# Patient Record
Sex: MALE | Race: WHITE | NOT HISPANIC OR LATINO
[De-identification: names, ages, dates, MRNs, and addresses within clinical notes are randomized per-mention and may not be internally consistent; named-entity substitution may affect disease eponyms.]

---

## 2021-01-22 ENCOUNTER — APPOINTMENT (OUTPATIENT)
Dept: CARDIOLOGY | Facility: CLINIC | Age: 86
End: 2021-01-22
Payer: MEDICARE

## 2021-01-22 ENCOUNTER — NON-APPOINTMENT (OUTPATIENT)
Age: 86
End: 2021-01-22

## 2021-01-22 VITALS
HEART RATE: 74 BPM | SYSTOLIC BLOOD PRESSURE: 140 MMHG | OXYGEN SATURATION: 100 % | BODY MASS INDEX: 26.46 KG/M2 | WEIGHT: 174 LBS | TEMPERATURE: 97.7 F | DIASTOLIC BLOOD PRESSURE: 60 MMHG

## 2021-01-22 PROCEDURE — 99214 OFFICE O/P EST MOD 30 MIN: CPT

## 2021-01-22 PROCEDURE — 93000 ELECTROCARDIOGRAM COMPLETE: CPT

## 2021-01-22 PROCEDURE — 99204 OFFICE O/P NEW MOD 45 MIN: CPT

## 2021-01-22 RX ORDER — OMEPRAZOLE 40 MG/1
40 CAPSULE, DELAYED RELEASE ORAL DAILY
Refills: 0 | Status: ACTIVE | COMMUNITY

## 2021-01-22 RX ORDER — FUROSEMIDE 40 MG/1
40 TABLET ORAL DAILY
Refills: 0 | Status: ACTIVE | COMMUNITY

## 2021-01-22 RX ORDER — POTASSIUM CHLORIDE 1500 MG/1
20 TABLET, EXTENDED RELEASE ORAL DAILY
Refills: 0 | Status: ACTIVE | COMMUNITY

## 2021-01-22 NOTE — ASSESSMENT
[FreeTextEntry1] : 1.  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  EKG and physical exam unremarkable\par \par 2.  Exertional shortness of breath usually with bending with normal EKG.  Doubt CHF.  Probably related to weight gain recent hormonal therapy for prostate carcinoma\par \par 3.  Prostate carcinoma on hormonal therapy at the present time\par \par 4.  Hypertension controlled\par \par 5 hyperlipidemia on statin\par \par 6.  Nasal polyps\par \par Presently his cardiac status is stable but he is complaining of some increase in shortness of breath.  The shortness of breath certainly could be related to the nasal polyps as well as some recent weight gain and the hormonal therapy

## 2021-01-22 NOTE — REASON FOR VISIT
[FreeTextEntry1] : Fred [place] 85 years old well known to me history of chronic ischemic heart disease status post PTCI status post bypass surgery, hypertension, hyperlipidemia nasal polyps.

## 2021-01-22 NOTE — PHYSICAL EXAM
[Normal Appearance] : normal appearance [General Appearance - Well Nourished] : well nourished [Normal Conjunctiva] : the conjunctiva exhibited no abnormalities [No Jugular Venous Garcia A Waves] : no jugular venous garcia A waves [Heart Sounds] : normal S1 and S2 [Murmurs] : no murmurs present [Respiration, Rhythm And Depth] : normal respiratory rhythm and effort [Auscultation Breath Sounds / Voice Sounds] : lungs were clear to auscultation bilaterally [Abdomen Soft] : soft [Abdomen Tenderness] : non-tender [Cyanosis, Localized] : no localized cyanosis [FreeTextEntry1] : No edema pulses 2+ dorsalis pedis

## 2021-01-22 NOTE — DISCUSSION/SUMMARY
[FreeTextEntry1] : 1.  2D echo to reevaluate LV and RV function pulmonary hypertension etc.\par \par 2.  Continue present regimen of medications\par \par 3.  Obtain his most recent bloods from Dr. Trevino

## 2021-01-22 NOTE — HISTORY OF PRESENT ILLNESS
[FreeTextEntry1] : Fred is 85 years old with a long history of chronic ischemic heart disease.  He presented many years ago with recurrent angina and underwent several stents to multiple vessels and finally had recurrence of symptoms and restenosis and underwent coronary bypass surgery many years ago by Dr. Carlos Greenfield\par \par Since bypass surgery he has had no recurrence of angina\par \par He has a history of nasal polyps which need to be removed, recent history of prostate carcinoma on hormonal therapy.  Since being on hormonal therapy he has had some increase in shortness of breath but no angina.  He has shortness of breath when he bends down or exerts himself significantly.  He has responded to the addition of Lasix.  Again he denies any chest pain chest pressure dizziness palpitations or syncope\par \par Presently he is feeling well with shortness of breath when he bends down or exerts himself significantly without associated chest pain.\par \par He is completing his hormonal therapy for prostate carcinoma\par \par He has gained weight during COVID-19 as well as from the hormonal therapy that he is receiving

## 2021-01-28 ENCOUNTER — APPOINTMENT (OUTPATIENT)
Dept: CARDIOLOGY | Facility: CLINIC | Age: 86
End: 2021-01-28

## 2021-03-04 ENCOUNTER — APPOINTMENT (OUTPATIENT)
Dept: CARDIOLOGY | Facility: CLINIC | Age: 86
End: 2021-03-04
Payer: MEDICARE

## 2021-03-04 VITALS
WEIGHT: 174 LBS | TEMPERATURE: 97.9 F | OXYGEN SATURATION: 95 % | BODY MASS INDEX: 26.37 KG/M2 | SYSTOLIC BLOOD PRESSURE: 174 MMHG | RESPIRATION RATE: 17 BRPM | HEART RATE: 71 BPM | DIASTOLIC BLOOD PRESSURE: 66 MMHG | HEIGHT: 68 IN

## 2021-03-04 PROCEDURE — 99213 OFFICE O/P EST LOW 20 MIN: CPT

## 2021-03-04 PROCEDURE — 93306 TTE W/DOPPLER COMPLETE: CPT

## 2021-03-04 NOTE — DISCUSSION/SUMMARY
[FreeTextEntry1] : 1.  I reassured him that his cardiac status was stable and there was unlikely that there was a cardiac cause of his shortness of breath\par \par 2.  Blood pressure needs better control I suggested low-salt diet exercise and continued weight reduction\par \par 3.  Continue present regimen of medications\par \par 4.  Routine follow with me in 4 months unless is a change in his status

## 2021-03-04 NOTE — ASSESSMENT
[FreeTextEntry1] : 1.  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  EKG and physical exam unremarkable and continue to be unremarkable\par \par 2.  Exertional shortness of breath usually with bending with normal EKG.  Doubt CHF.  Probably related to weight gain recent hormonal therapy for prostate carcinoma, nasal congestion and wearing a mask\par \par 3.  Prostate carcinoma on hormonal therapy at the present time\par \par 4.  Hypertension the patient anxious and systolic pressure is somewhat elevated diastolic pressure normal\par \par 5 hyperlipidemia on statin\par \par 6.  Nasal polyps\par \par Her cardiogram performed today preliminarily reveals overall preserved LV function and no significant valvular disease

## 2021-03-04 NOTE — PHYSICAL EXAM
[Normal Appearance] : normal appearance [General Appearance - Well Nourished] : well nourished [Normal Conjunctiva] : the conjunctiva exhibited no abnormalities [No Jugular Venous Garcia A Waves] : no jugular venous garcia A waves [Respiration, Rhythm And Depth] : normal respiratory rhythm and effort [Auscultation Breath Sounds / Voice Sounds] : lungs were clear to auscultation bilaterally [Heart Sounds] : normal S1 and S2 [Murmurs] : no murmurs present [Abdomen Soft] : soft [Abdomen Tenderness] : non-tender [Cyanosis, Localized] : no localized cyanosis [FreeTextEntry1] : No edema pulses 2+ dorsalis pedis

## 2021-03-04 NOTE — HISTORY OF PRESENT ILLNESS
[FreeTextEntry1] : Fred is 85 years old with a long history of chronic ischemic heart disease.  He presented many years ago with recurrent angina and underwent several stents to multiple vessels and finally had recurrence of symptoms and restenosis and underwent coronary bypass surgery many years ago by Dr. Carlos Greenfield\par \par Since bypass surgery he has had no recurrence of angina\par \par He has a history of nasal polyps which need to be removed, recent history of prostate carcinoma on hormonal therapy.  Since being on hormonal therapy he has had some increase in shortness of breath but no angina.  He has shortness of breath when he bends down or exerts himself significantly.  He has responded to the addition of Lasix.  Again he denies any chest pain chest pressure dizziness palpitations or syncope\par \par Last visit he was not feeling well with shortness of breath when he bends down or exerts himself significantly without associated chest pain.  There was no chest pain\par \par He is completing his hormonal therapy for prostate carcinoma\par \par He has gained weight during COVID-19 as well as from the hormonal therapy that he is receiving\par \par The symptoms of shortness of breath with bending down have continued but he does have nasal congestion and is wearing a mask which of the times when the shortness of breath is wors\par \par There is no associated chest pain.  He does feel occasionally that his chest wall is uncomfortable but this is unrelated to exertion\par \par echo cardiogram performed today preliminarily reveals overall preserved LV function with perhaps some septal hypokinesia with paradoxical septal motion post surgery.

## 2021-03-04 NOTE — REASON FOR VISIT
[FreeTextEntry1] : Fred [place] 85 years old here for follow-up of his cardiac status with recent symptoms of shortness of breath most likely related to his nasal congestion and the wearing of mask rather than a cardiac etiology\par Echocardiogram was performed today

## 2021-07-06 ENCOUNTER — APPOINTMENT (OUTPATIENT)
Dept: CARDIOLOGY | Facility: CLINIC | Age: 86
End: 2021-07-06
Payer: MEDICARE

## 2021-07-06 ENCOUNTER — NON-APPOINTMENT (OUTPATIENT)
Age: 86
End: 2021-07-06

## 2021-07-06 VITALS
RESPIRATION RATE: 17 BRPM | WEIGHT: 170 LBS | DIASTOLIC BLOOD PRESSURE: 61 MMHG | HEIGHT: 68 IN | HEART RATE: 61 BPM | SYSTOLIC BLOOD PRESSURE: 122 MMHG | OXYGEN SATURATION: 97 % | BODY MASS INDEX: 25.76 KG/M2

## 2021-07-06 PROCEDURE — 99214 OFFICE O/P EST MOD 30 MIN: CPT

## 2021-07-06 PROCEDURE — 93000 ELECTROCARDIOGRAM COMPLETE: CPT

## 2021-07-06 NOTE — HISTORY OF PRESENT ILLNESS
[FreeTextEntry1] : To review Fred is 85 years old with a long history of chronic ischemic heart disease.  He presented many years ago with recurrent angina and underwent several stents to multiple vessels and finally had recurrence of symptoms and restenosis and underwent coronary bypass surgery many years ago by Dr. Carlos Greenfield\par \par Since bypass surgery he has had no recurrence of angina\par \par He has a history of nasal polyps which need to be removed, recent history of prostate carcinoma on hormonal therapy.  Since being on hormonal therapy he has had some increase in shortness of breath but no angina.  He has shortness of breath when he bends down or exerts himself significantly.  He has responded to the addition of Lasix.  Again he denies any chest pain chest pressure dizziness palpitations or syncope\par \par Last visit he was not feeling well with shortness of breath when he bends down or exerts himself significantly without associated chest pain.  There was no chest pain\par \par He is completing his hormonal therapy for prostate carcinoma\par \par He has gained weight during COVID-19 as well as from the hormonal therapy that he is receiving\par \par The symptoms of shortness of breath with bending down have continued but he does have nasal congestion and is wearing a mask which of the times when the shortness of breath is worse\par \par echo cardiogram performed in May 2021 revealed mild paradoxical septal motion secondary to previous cardiac surgery otherwise no valve disease normal LV and RV function and no pulmonary hypertension\par \par Since last visit he has been stable.  He does feel shortness of breath with the mask and also secondary to severe nasal polyps being followed by ear nose and throat.  He has no chest pain palpitations dizziness or syncope\par \par EKG today reveals normal sinus rhythm sinus bradycardia first-degree AV block otherwise within normal limits

## 2021-07-06 NOTE — ASSESSMENT
[FreeTextEntry1] : 1.  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  EKG and physical exam unremarkable and continue to be unremarkable.  Echocardiogram reveals normal LV function no valvular disease\par \par 2.  Exertional shortness of breath usually with bending with normal EKG and normal echo.  The symptoms of shortness of breath are somewhat improved and probably related to his nasal polyps and wearing a mask\par \par 3.  Prostate carcinoma on hormonal therapy at the present time\par \par 4.  Hypertension well-controlled today\par \par 5 hyperlipidemia on statin\par \par 6.  Nasal polyps\par \par

## 2021-07-06 NOTE — PHYSICAL EXAM
[Normal Appearance] : normal appearance [General Appearance - Well Nourished] : well nourished [Normal Conjunctiva] : the conjunctiva exhibited no abnormalities [No Jugular Venous Garcia A Waves] : no jugular venous garcia A waves [Auscultation Breath Sounds / Voice Sounds] : lungs were clear to auscultation bilaterally [Respiration, Rhythm And Depth] : normal respiratory rhythm and effort [Heart Sounds] : normal S1 and S2 [Murmurs] : no murmurs present [Abdomen Soft] : soft [Abdomen Tenderness] : non-tender [Cyanosis, Localized] : no localized cyanosis [FreeTextEntry1] : No edema pulses 2+ dorsalis pedis

## 2021-07-06 NOTE — DISCUSSION/SUMMARY
[FreeTextEntry1] : 1.  I reassured him that his cardiac status was stable and there was unlikely that there was a cardiac cause of his shortness of breath\par \par 2.  Continue present regimen medications.  Continue with low-salt diet and low caloric diet and encourage exercise\par \par Routine follow with me again in 4 months

## 2021-11-02 ENCOUNTER — APPOINTMENT (OUTPATIENT)
Dept: CARDIOLOGY | Facility: CLINIC | Age: 86
End: 2021-11-02
Payer: MEDICARE

## 2021-11-02 ENCOUNTER — NON-APPOINTMENT (OUTPATIENT)
Age: 86
End: 2021-11-02

## 2021-11-02 VITALS
OXYGEN SATURATION: 92 % | HEIGHT: 68 IN | SYSTOLIC BLOOD PRESSURE: 135 MMHG | DIASTOLIC BLOOD PRESSURE: 71 MMHG | HEART RATE: 58 BPM | BODY MASS INDEX: 26.07 KG/M2 | WEIGHT: 172 LBS

## 2021-11-02 PROCEDURE — 99214 OFFICE O/P EST MOD 30 MIN: CPT

## 2021-11-02 PROCEDURE — 93000 ELECTROCARDIOGRAM COMPLETE: CPT

## 2021-11-02 RX ORDER — AMLODIPINE BESYLATE 2.5 MG/1
2.5 TABLET ORAL
Refills: 0 | Status: ACTIVE | COMMUNITY

## 2021-11-02 NOTE — HISTORY OF PRESENT ILLNESS
[FreeTextEntry1] : To review Fred is 85 years old with a long history of chronic ischemic heart disease.  He presented many years ago with recurrent angina and underwent several stents to multiple vessels and finally had recurrence of symptoms and restenosis and underwent coronary bypass surgery many years ago by Dr. Carlos Greenfield\par \par Since bypass surgery he has had no recurrence of angina\par \par He has a history of nasal polyps which need to be removed, recent history of prostate carcinoma on hormonal therapy.  Since being on hormonal therapy he has had some increase in shortness of breath but no angina.  He has shortness of breath when he bends down or exerts himself significantly.  He has responded to the addition of Lasix.  Again he denies any chest pain chest pressure dizziness palpitations or syncope\par \par Last visit he was not feeling well with shortness of breath when he bends down or exerts himself significantly without associated chest pain.  There was no chest pain\par \par He is completing his hormonal therapy for prostate carcinoma\par \par He has gained weight during COVID-19 as well as from the hormonal therapy that he is receiving\par \par The symptoms of shortness of breath with bending down have continued but he does have nasal congestion and is wearing a mask which of the times when the shortness of breath is worse\par \par echo cardiogram performed in May 2021 revealed mild paradoxical septal motion secondary to previous cardiac surgery otherwise no valve disease normal LV and RV function and no pulmonary hypertension\par \par Patient continues to complain of shortness of breath at 1-2 blocks or with bending down.  There is no associated chest pain.  However he does have significant nasal congestion and nasal polyps.  The symptoms have been stable.  On the other hand he does move around quite a bit during the day and walks over 2000 steps.\par \par EKG today is unchanged sinus bradycardia first-degree AV block otherwise within normal limits\par \par

## 2021-11-02 NOTE — DISCUSSION/SUMMARY
[FreeTextEntry1] : Fred should continue on his present regimen of medications.  He presently is not taking amlodipine 2.5.  He remains on Lasix 40 metoprolol 50 twice daily potassium replacement aspirin 81 and simvastatin 20.\par \par Routine follow with me again in 3 months\par I will obtain most recent blood work from Dr. Mendoza

## 2021-11-02 NOTE — ASSESSMENT
[FreeTextEntry1] : 1.  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  EKG and physical exam unremarkable and continue to be unremarkable.  Echocardiogram reveals normal LV function no valvular disease.\par \par 2.  Exertional shortness of breath usually with bending with normal EKG and normal echo.  The symptoms of shortness of breath are somewhat improved but still bothersome to him and probably related to his nasal polyps and wearing a mask\par \par 3.  Prostate carcinoma of hormonal therapy at this point\par \par 4.  Hypertension well-controlled today\par \par 5 hyperlipidemia on statin\par \par 6.  Nasal polyps\par \par His main complaint is the shortness of breath which I do not believe is cardiac related.  He is no active angina.

## 2021-12-30 ENCOUNTER — APPOINTMENT (OUTPATIENT)
Dept: CARDIOLOGY | Facility: CLINIC | Age: 86
End: 2021-12-30
Payer: MEDICARE

## 2021-12-30 ENCOUNTER — NON-APPOINTMENT (OUTPATIENT)
Age: 86
End: 2021-12-30

## 2021-12-30 VITALS
SYSTOLIC BLOOD PRESSURE: 130 MMHG | DIASTOLIC BLOOD PRESSURE: 60 MMHG | RESPIRATION RATE: 17 BRPM | BODY MASS INDEX: 25.76 KG/M2 | OXYGEN SATURATION: 99 % | HEART RATE: 53 BPM | HEIGHT: 68 IN | WEIGHT: 170 LBS

## 2021-12-30 DIAGNOSIS — I20.9 ANGINA PECTORIS, UNSPECIFIED: ICD-10-CM

## 2021-12-30 PROCEDURE — 93000 ELECTROCARDIOGRAM COMPLETE: CPT

## 2021-12-30 PROCEDURE — 99214 OFFICE O/P EST MOD 30 MIN: CPT

## 2021-12-30 NOTE — REASON FOR VISIT
[FreeTextEntry1] : Fred [place] 86-year-old seen urgently for evaluation of chest pain of concern to the patient

## 2021-12-30 NOTE — DISCUSSION/SUMMARY
[FreeTextEntry1] : I reassured him that his cardiac status was stable.  I reassured him that this pain was likely musculoskeletal noncardiac.  His physical exam is unremarkable blood pressure is stable stable sinus bradycardia and EKG without change\par \par He will follow with me again in 3 months.\par No further cardiac work-up is needed presently

## 2021-12-30 NOTE — ASSESSMENT
[FreeTextEntry1] : 1.  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  EKG and physical exam unremarkable and continue to be unremarkable.  Echocardiogram reveals normal LV function no valvular disease.  Recent left-sided chest pain is atypical musculoskeletal in nature noncardiac\par \par 2.  Exertional shortness of breath usually with bending with normal EKG and normal echo.  The symptoms of shortness of breath are somewhat improved but still bothersome to him and probably related to his nasal polyps and wearing a mask\par \par 3.  Prostate carcinoma of hormonal therapy at this point\par \par 4.  Hypertension well-controlled today\par \par 5 hyperlipidemia on statin\par \par 6.  Nasal polyps\par \par 7.  Acute noncardiac chest pain normal EKG normal physical exam pinpoint tenderness in the area\par \par His main complaint is the shortness of breath which I do not believe is cardiac related.  He is no active angina.

## 2021-12-30 NOTE — HISTORY OF PRESENT ILLNESS
[FreeTextEntry1] : To review Fred is 85 years old with a long history of chronic ischemic heart disease.  He presented many years ago with recurrent angina and underwent several stents to multiple vessels and finally had recurrence of symptoms and restenosis and underwent coronary bypass surgery many years ago by Dr. Carlos Greenfield\par \par Since bypass surgery he has had no recurrence of angina\par \par He has a history of nasal polyps which need to be removed, recent history of prostate carcinoma on hormonal therapy.  Since being on hormonal therapy he has had some increase in shortness of breath but no angina.  He has shortness of breath when he bends down or exerts himself significantly.  He has responded to the addition of Lasix.  Again he denies any chest pain chest pressure dizziness palpitations or syncope\par \par Last visit he was not feeling well with shortness of breath when he bends down or exerts himself significantly without associated chest pain.  There was no chest pain\par \par He is completing his hormonal therapy for prostate carcinoma\par \par He has gained weight during COVID-19 as well as from the hormonal therapy that he is receiving\par \par The symptoms of shortness of breath with bending down have continued but he does have nasal congestion and is wearing a mask which of the times when the shortness of breath is worse\par \par echo cardiogram performed in May 2021 revealed mild paradoxical septal motion secondary to previous cardiac surgery otherwise no valve disease normal LV and RV function and no pulmonary hypertension\par \par On last visit, the patient continued  to complain of shortness of breath at 1-2 blocks or with bending down.  There is no associated chest pain.  However he does have significant nasal congestion and nasal polyps.  The symptoms have been stable.  On the other hand he does move around quite a bit during the day and walks over 2000 steps.\par \par He recently did a lot of preparation for [place] and invited his nieces.  Did a lot of sweeping and moving of his chest area, he did a lot of movement around. Over the last 2 to 3 days he developed pinpoint left-sided chest pain sharp in nature worse when he touches the area or moves not worse with breathing. His respiratory status remains stable with similar exertional shortness of breath particularly with bending down.\par \par EKG today reveals sinus bradycardia otherwise within normal limits with a prolonged WA interval it is unchanged\par \par \par \par

## 2022-02-02 ENCOUNTER — APPOINTMENT (OUTPATIENT)
Dept: CARDIOLOGY | Facility: CLINIC | Age: 87
End: 2022-02-02
Payer: MEDICARE

## 2022-02-02 VITALS
HEIGHT: 68 IN | BODY MASS INDEX: 25.76 KG/M2 | OXYGEN SATURATION: 97 % | DIASTOLIC BLOOD PRESSURE: 60 MMHG | HEART RATE: 67 BPM | WEIGHT: 170 LBS | SYSTOLIC BLOOD PRESSURE: 120 MMHG

## 2022-02-02 PROCEDURE — 99214 OFFICE O/P EST MOD 30 MIN: CPT

## 2022-02-02 NOTE — ASSESSMENT
[FreeTextEntry1] : 1.  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  EKG and physical exam unremarkable and continue to be unremarkable.  Echocardiogram reveals normal LV function no valvular disease.  Recent left-sided chest pain is atypical musculoskeletal in nature noncardiac\par \par 2.  Exertional shortness of breath usually with bending with normal EKG and normal echo.  The symptoms of shortness of breath are more likely related to his nasal polyps then to a cardiac issue.  He did exercise for me and his saturation remained at 98%\par \par 3.  Prostate carcinoma of hormonal therapy at this point\par \par 4.  Hypertension well-controlled today\par \par 5 hyperlipidemia on statin\par \par 6.  Nasal polyps\par \par 7.  Hematuria/renal stone scheduled for lithotripsy

## 2022-02-02 NOTE — HISTORY OF PRESENT ILLNESS
[FreeTextEntry1] : To review Fred is 85 years old with a long history of chronic ischemic heart disease.  He presented many years ago with recurrent angina and underwent several stents to multiple vessels and finally had recurrence of symptoms and restenosis and underwent coronary bypass surgery many years ago by Dr. Carlos Greenfield\par \par Since bypass surgery he has had no recurrence of angina\par \par He has a history of nasal polyps which need to be removed, recent history of prostate carcinoma on hormonal therapy.  Since being on hormonal therapy he has had some increase in shortness of breath but no angina.  He has shortness of breath when he bends down or exerts himself significantly.  He has responded to the addition of Lasix.  Again he denies any chest pain chest pressure dizziness palpitations or syncope\par \par Last visit he was not feeling well with shortness of breath when he bends down or exerts himself significantly without associated chest pain.  There was no chest pain\par \par He is completing his hormonal therapy for prostate carcinoma\par \par He has gained weight during COVID-19 as well as from the hormonal therapy that he is receiving\par \par The symptoms of shortness of breath with bending down have continued but he does have nasal congestion and is wearing a mask which of the times when the shortness of breath is worse\par \par echo cardiogram performed in May 2021 revealed mild paradoxical septal motion secondary to previous cardiac surgery otherwise no valve disease normal LV and RV function and no pulmonary hypertension\par \par On last visit, the patient continued  to complain of shortness of breath at 1-2 blocks or with bending down.  There is no associated chest pain.  However he does have significant nasal congestion and nasal polyps.  The symptoms have been stable.  On the other hand he does move around quite a bit during the day and walks over 2000 steps.\par \par He recently did a lot of preparation for [place] and invited his nieces.  Did a lot of sweeping and moving of his chest area, he did a lot of movement around. Over the last 2 to 3 days he developed pinpoint left-sided chest pain sharp in nature worse when he touches the area or moves not worse with breathing. His respiratory status remains stable with similar exertional shortness of breath particularly with bending down.\par \par EKG December 30 revealed sinus bradycardia otherwise within normal limits with a prolonged RI interval it is unchanged\par \par The patient continues to have shortness of breath without chest pain.  He does have severe nasal polyps which I believe is the main cause of his shortness of breath.  He has no chest pain\par \par He apparently recently developed hematuria had a cystoscopy and the diagnosis of a renal stone was made and he is scheduled for lithotripsy which will require anesthesia\par \par His main concern is that on the Lasix he is passing a large amount of urine (Lasix 40 mg) I would like to cut it back.  He has no significant edema of his lower extremities presently\par \par \par \par \par \par \par \par

## 2022-02-02 NOTE — DISCUSSION/SUMMARY
[FreeTextEntry1] : 1.  I reassured him that his cardiac status was stable and the shortness of breath was probably not related to any cardiac issue.\par \par 2.  At this point, I see no cardiac contraindication to the planned lithotripsy.  He can stop his aspirin 7 days before.  No further cardiac work-up is needed.  He did have a recent echo that revealed normal left ventricular function and he has no active angina\par \par 3.  The patient can decrease his Lasix from 40 mg to 20\par Routine follow with me again in 3 months

## 2022-02-16 ENCOUNTER — APPOINTMENT (OUTPATIENT)
Dept: CARDIOLOGY | Facility: CLINIC | Age: 87
End: 2022-02-16
Payer: MEDICARE

## 2022-02-16 VITALS
SYSTOLIC BLOOD PRESSURE: 146 MMHG | HEART RATE: 54 BPM | RESPIRATION RATE: 17 BRPM | WEIGHT: 175 LBS | DIASTOLIC BLOOD PRESSURE: 70 MMHG | HEIGHT: 68 IN | OXYGEN SATURATION: 100 % | BODY MASS INDEX: 26.52 KG/M2

## 2022-02-16 PROCEDURE — 99214 OFFICE O/P EST MOD 30 MIN: CPT

## 2022-02-16 NOTE — DISCUSSION/SUMMARY
[FreeTextEntry1] : 1.  No further cardiac work-up is needed\par \par 2.  The patient will make an urgent visit  for reevaluation of his carotids and a carotid Doppler\par \par 3.  Pending this evaluation he can proceed with lithotripsy.  Certainly from a cardiac point of view he is stable

## 2022-02-16 NOTE — HISTORY OF PRESENT ILLNESS
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  Clinically he has been doing quite well without chest pain no shortness of breath.  On occasion he has some atypical chest pain.\par \par Recent echo revealed preserved LV function and EKG normal sinus rhythm otherwise unremarkable.  There is no evidence of an arrhythmia\par \par He developed hematuria and the diagnosis of a renal stone and he was scheduled for lithotripsy\par \par However he related to his internist that he an episode of decreased vision in the left eye approximately 4 months ago.  He has had no significant recurrence of that but occasionally has some numbness in his right arm\par \par He does have a history of known carotid disease and is followed by Dr. Jones and was told in the past he may have about a 50 to 70% stenosis in his carotid.  He has not had a repeat neurologic evaluation with carotid Doppler for some time\par \par Again from a cardiac point of view he is stable\par

## 2022-02-16 NOTE — REASON FOR VISIT
[FreeTextEntry1] : Patricia evans 86 years old was to undergo lithotripsy for hematuria and presumed renal stone.  However he related to his internist Dr. Mendoza that he had an episode of loss of vision in the left eye.  Actually this occurred over 4 months ago.

## 2022-02-16 NOTE — ASSESSMENT
[FreeTextEntry1] : Fred SALDANA'Brien 86 years old had an episode of decreased vision in the left eye approximately 4 months ago.  He has not had any recurrence but has known carotid artery disease followed by neurology but has not had an evaluation for some time\par \par From a cardiac point of view he is stable hemodynamically without angina or CHF recent echo with preserved LV function.\par \par He is scheduled for lithotripsy which is on hold until neurologic evaluation is completed

## 2022-02-16 NOTE — PHYSICAL EXAM
[Well Developed] : well developed [Well Nourished] : well nourished [No Acute Distress] : no acute distress [Normal Conjunctiva] : normal conjunctiva [No Carotid Bruit] : no carotid bruit [Normal S1, S2] : normal S1, S2 [No Murmur] : no murmur [No Edema] : no edema

## 2022-04-05 ENCOUNTER — APPOINTMENT (OUTPATIENT)
Dept: CARDIOLOGY | Facility: CLINIC | Age: 87
End: 2022-04-05
Payer: MEDICARE

## 2022-04-05 VITALS
OXYGEN SATURATION: 100 % | BODY MASS INDEX: 26.61 KG/M2 | HEART RATE: 52 BPM | DIASTOLIC BLOOD PRESSURE: 56 MMHG | SYSTOLIC BLOOD PRESSURE: 128 MMHG | WEIGHT: 175 LBS

## 2022-04-05 DIAGNOSIS — C61 MALIGNANT NEOPLASM OF PROSTATE: ICD-10-CM

## 2022-04-05 PROCEDURE — 99214 OFFICE O/P EST MOD 30 MIN: CPT

## 2022-04-06 RX ORDER — TAMSULOSIN HYDROCHLORIDE 0.4 MG/1
0.4 CAPSULE ORAL
Refills: 0 | Status: ACTIVE | COMMUNITY

## 2022-04-06 RX ORDER — CIPROFLOXACIN HYDROCHLORIDE 500 MG/1
500 TABLET, FILM COATED ORAL
Qty: 8 | Refills: 0 | Status: ACTIVE | COMMUNITY
Start: 2022-03-20

## 2022-04-06 RX ORDER — PHENAZOPYRIDINE HYDROCHLORIDE 100 MG/1
100 TABLET ORAL
Refills: 0 | Status: ACTIVE | COMMUNITY

## 2022-04-08 NOTE — REASON FOR VISIT
[FreeTextEntry1] : Patricia evans 86 years old was to undergo lithotripsy for hematuria and presumed renal stone.  However he related to his internist Dr. Mendoza that he had an episode of loss of vision in the left eye subsequently evaluated by neurology and neurosurgery with a total occlusion being treated medically.  He now comes for follow-up with symptoms of shortness of breath

## 2022-04-08 NOTE — DISCUSSION/SUMMARY
[FreeTextEntry1] : 1.  Presently his cardiac status appears to be stable and I do not feel there is evidence of CHF or angina\par \par 2.  Pulmonary consultation concerning his shortness of breath and ENT follow-up concerning his nasal polyps\par \par 3.  Follow-up with neurology and neurosurgery concerning the carotid disease\par \par 4.  I see no cardiac contraindication to another urologic procedure\par \par Patient can stop aspirin for 5 days prior to the urologic procedure but should restart immediately thereafter.  There is no contraindication to the planned urologic procedure

## 2022-04-08 NOTE — ASSESSMENT
[FreeTextEntry1] : Fred SALDANA'Brien 86 years old had an episode of decreased vision in the left eye approximately 4 months ago.  He has not had any recurrence but has known carotid artery disease followed by neurology but has not had an evaluation for some time\par \par From a cardiac point of view he is stable hemodynamically without angina or CHF last echo 2021 with preserved LV function.\par \par He does have significant carotid disease on the left side with a total occlusion but presently neurologically is asymptomatic.\par \par He is complaining of some increased shortness of breath with bending down and with certain activities but no chest pain.  He does have significant nasal polyps.  His cardiac status appears to be stable and he does not have any active angina\par \par 1.  Chronic ischemic heart disease status post remote PTCI status post coronary bypass surgery no active angina\par \par 2.  Chronic shortness of breath etiology unclear perhaps some diastolic dysfunction but LV function in the past has been normal and there is no angina.  He does have nasal polyps which interferes with his breathing particularly with changing in position\par \par 3.  Nasal polyps\par  4.  Carotid artery disease\par \par 5.  Renal stone status post renal stent will need a repeat procedure\par \par \par \par

## 2022-04-08 NOTE — HISTORY OF PRESENT ILLNESS
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  Clinically he has been doing quite well without chest pain no shortness of breath.  On occasion he has some atypical chest pain.\par \par 2D echo March 2021 revealed preserved LV function and EKG normal sinus rhythm otherwise unremarkable.  There is no evidence of an arrhythmia\par \par He developed hematuria and the diagnosis of a renal stone and he was scheduled for lithotripsy\par \par However he related to his internist that he an episode of decreased vision in the left eye approximately 4 months ago.  He has had no significant recurrence of that but occasionally has some numbness in his right arm\par \par He does have a history of known carotid disease and is followed by Dr. Jones and was told in the past he may have about a 50 to 70% stenosis in his carotid.  He has not had a repeat neurologic evaluation with carotid Doppler for some time\par \par Subsequent evaluation apparently revealed a total occlusion of the carotid and medical therapy was recommended.  I do not have any hard copy of the results of that testing.  Apparently he is scheduled for other testing by the neurosurgeon.  He has no neurologic symptoms\par \par He recently was admitted with severe back pain from a renal stone and required cystoscopy and deployment of a stent.  He still has some back pain but it is improved\par \par He does relate that he has been somewhat more short of breath though this is a chronic issue.  He does have significant nasal polyps.  He was not a smoker.  He has been scheduled to see pulmonology.  He will require another procedure for the stent removal of the stent etc.  He denies chest pain or chest pressure or palpitations\par \par \par

## 2022-04-26 ENCOUNTER — APPOINTMENT (OUTPATIENT)
Dept: CARDIOLOGY | Facility: CLINIC | Age: 87
End: 2022-04-26

## 2022-06-07 ENCOUNTER — APPOINTMENT (OUTPATIENT)
Dept: CARDIOLOGY | Facility: CLINIC | Age: 87
End: 2022-06-07
Payer: MEDICARE

## 2022-06-07 ENCOUNTER — NON-APPOINTMENT (OUTPATIENT)
Age: 87
End: 2022-06-07

## 2022-06-07 VITALS
WEIGHT: 173 LBS | HEIGHT: 68 IN | OXYGEN SATURATION: 100 % | DIASTOLIC BLOOD PRESSURE: 60 MMHG | HEART RATE: 86 BPM | BODY MASS INDEX: 26.22 KG/M2 | SYSTOLIC BLOOD PRESSURE: 128 MMHG

## 2022-06-07 DIAGNOSIS — Z01.810 ENCOUNTER FOR PREPROCEDURAL CARDIOVASCULAR EXAMINATION: ICD-10-CM

## 2022-06-07 DIAGNOSIS — R07.89 OTHER CHEST PAIN: ICD-10-CM

## 2022-06-07 DIAGNOSIS — N20.0 CALCULUS OF KIDNEY: ICD-10-CM

## 2022-06-07 PROCEDURE — 99214 OFFICE O/P EST MOD 30 MIN: CPT

## 2022-06-07 PROCEDURE — 93000 ELECTROCARDIOGRAM COMPLETE: CPT

## 2022-06-07 NOTE — REASON FOR VISIT
[FreeTextEntry1] : Fred Solomon 6 years old here for preop cardiovascular evaluation prior to undergoing stone removal via cystoscopy

## 2022-06-07 NOTE — HISTORY OF PRESENT ILLNESS
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  Clinically he has been doing quite well without chest pain no shortness of breath.  On occasion he has some atypical chest pain.\par \par 2D echo March 2021 revealed preserved LV function and EKG normal sinus rhythm otherwise unremarkable.  There is no evidence of an arrhythmia\par \par He developed hematuria and the diagnosis of a renal stone and he was scheduled for lithotripsy\par \par However he related to his internist that he an episode of decreased vision in the left eye approximately 4 months ago.  He has had no significant recurrence of that but occasionally has some numbness in his right arm\par \par He does have a history of known carotid disease and is followed by Dr. Jones and was told in the past he may have about a 50 to 70% stenosis in his carotid.  He has not had a repeat neurologic evaluation with carotid Doppler for some time\par \par Subsequent evaluation apparently revealed a total occlusion of the carotid and medical therapy was recommended.  I do not have any hard copy of the results of that testing.  Apparently he is scheduled for other testing by the neurosurgeon.  He has no neurologic symptoms\par \par He recently was admitted with severe back pain from a renal stone and required cystoscopy and deployment of a stent.  He still has some back pain but it is improved\par \par The patient recently underwent lumbar trypsin and some of the stones were removed but there is still stones present.  He also has a stent\par \par From a cardiac point of view he has been stable with no chest pain chest pressure.  He does have chronic shortness of breath related to his nasal polyps.  He has had no neurologic symptoms\par \par EKG June 7, 2022 normal sinus rhythm within normal limits\par \par \par

## 2022-06-07 NOTE — DISCUSSION/SUMMARY
[FreeTextEntry1] : Patient is hemodynamically stable from a cardiac point of view to undergo the urologic procedure.  No further cardiac work-up is needed.  He remains free of angina his EKG is normal and his physical exam unremarkable.

## 2022-06-07 NOTE — PHYSICAL EXAM
[Well Developed] : well developed [Well Nourished] : well nourished [No Acute Distress] : no acute distress [Normal Conjunctiva] : normal conjunctiva [No Carotid Bruit] : no carotid bruit [Normal S1, S2] : normal S1, S2 [No Murmur] : no murmur [Clear Lung Fields] : clear lung fields [No Respiratory Distress] : no respiratory distress  [No Edema] : no edema [de-identified] : Breathes through his nose but no rales rhonchi or wheezes today

## 2022-06-07 NOTE — ASSESSMENT
[FreeTextEntry1] : Fred SALDANA'Brien 86 years old had an episode of decreased vision in the left eye approximately 4 months ago.  He has not had any recurrence but has known carotid artery disease followed by neurology but has not had an evaluation for some time\par \par From a cardiac point of view he is stable hemodynamically without angina or CHF last echo 2021 with preserved LV function.\par \par He does have significant carotid disease on the left side with a total occlusion but presently neurologically is asymptomatic.\par \par He is status post lithotripsy and now needs removal of the stone and is here for preop evaluation.  From a cardiac point of view he has been stable with a normal EKG normal physical exam no angina\par \par 1.  Chronic ischemic heart disease status post remote PTCI status post coronary bypass surgery no active angina\par \par 2.  Chronic shortness of breath etiology unclear perhaps some diastolic dysfunction but LV function in the past has been normal and there is no angina.  He does have nasal polyps which interferes with his breathing particularly with changing in position\par \par 3.  Nasal polyps\par  4.  Carotid artery disease\par \par 5.  Renal stone status post renal stent will need a repeat procedure.\par \par \par \par

## 2022-09-07 ENCOUNTER — NON-APPOINTMENT (OUTPATIENT)
Age: 87
End: 2022-09-07

## 2022-09-07 ENCOUNTER — APPOINTMENT (OUTPATIENT)
Dept: CARDIOLOGY | Facility: CLINIC | Age: 87
End: 2022-09-07

## 2022-09-07 VITALS
OXYGEN SATURATION: 99 % | HEIGHT: 68 IN | HEART RATE: 77 BPM | BODY MASS INDEX: 24.86 KG/M2 | DIASTOLIC BLOOD PRESSURE: 68 MMHG | WEIGHT: 164 LBS | SYSTOLIC BLOOD PRESSURE: 110 MMHG

## 2022-09-07 PROCEDURE — 99214 OFFICE O/P EST MOD 30 MIN: CPT

## 2022-09-07 PROCEDURE — 93000 ELECTROCARDIOGRAM COMPLETE: CPT

## 2022-09-07 NOTE — ASSESSMENT
[FreeTextEntry1] :  the patient's cardiac status is quite stable since last visit.  He has no angina or evidence of CHF.  His EKG is unremarkable except for first-degree AV block and sinus bradycardia\par \par 1.  Chronic ischemic heart disease status post remote PTCI status post coronary bypass surgery no active angina\par \par 2.  Chronic shortness of breath etiology unclear perhaps some diastolic dysfunction but LV function in the past has been normal and there is no angina.  He does have nasal polyps which interferes with his breathing particularly with changing in position.  I believe most of his shortness of breath is related to his nasal polyps and not cardiac related\par \par 3.  Nasal polyps\par  4.  Carotid artery disease- being treated medically total occlusion followed by neurology\par \par 5.  Renal stone status post renal stent  and lithotripsy now stents removed\par \par \par \par

## 2022-09-07 NOTE — HISTORY OF PRESENT ILLNESS
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  Clinically he has been doing quite well without chest pain no shortness of breath.  On occasion he has some atypical chest pain.\par \par 2D echo March 2021 revealed preserved LV function and EKG normal sinus rhythm otherwise unremarkable.  There is no evidence of an arrhythmia\par \par He developed hematuria and the diagnosis of a renal stone and he was scheduled for lithotripsy\par \par However he related to his internist that he an episode of decreased vision in the left eye approximately 4 months ago.  He has had no significant recurrence of that but occasionally has some numbness in his right arm\par \par He does have a history of known carotid disease and is followed by Dr. Jones and was told in the past he may have about a 50 to 70% stenosis in his carotid.  He has not had a repeat neurologic evaluation with carotid Doppler for some time\par \par Subsequent evaluation apparently revealed a total occlusion of the carotid and medical therapy was recommended.  I do not have any hard copy of the results of that testing.  Apparently he is scheduled for other testing by the neurosurgeon.  He has no neurologic symptoms\par \par He recently was admitted with severe back pain from a renal stone and required cystoscopy and deployment of a stent.  He still has some back pain but it is improved\par \par The patient recently underwent lumbar trypsin and some of the stones were removed but there is still stones present.  He also has a stent\par \par From a cardiac point of view he has been stable with no chest pain chest pressure.  He does have chronic shortness of breath related to his nasal polyps.  He has had no neurologic symptoms\par \par EKG June 7, 2022 normal sinus rhythm Probable first-degree AV block  otherwisewithin normal limits\par \par  he has had several urologic procedures and eventually the stent was removed and he has good urine output and no urologic issues at the present time\par \par .  He denies chest pain or chest pressure does have shortness of breath but probably mostly related to his nasal polyps which will be treated with injections as of the most recent otolaryngology appointment\par \par  EKG dated September 7, 2022 normal sinus rhythm sinus bradycardia occasional APC first-degree AV block otherwise within normal limits\par \par \par

## 2022-09-07 NOTE — DISCUSSION/SUMMARY
[FreeTextEntry1] :  from a cardiac point of view, the patient is quite stable.  He should continue on his present regimen of medications.  No further cardiac work-up is needed.\par \par   Routine follow with me again in 3 months [EKG obtained to assist in diagnosis and management of assessed problem(s)] : EKG obtained to assist in diagnosis and management of assessed problem(s)

## 2022-09-07 NOTE — PHYSICAL EXAM
[Well Developed] : well developed [Well Nourished] : well nourished [No Acute Distress] : no acute distress [Normal Conjunctiva] : normal conjunctiva [No Carotid Bruit] : no carotid bruit [Normal S1, S2] : normal S1, S2 [No Murmur] : no murmur [Clear Lung Fields] : clear lung fields [No Respiratory Distress] : no respiratory distress  [No Edema] : no edema [de-identified] : Breathes through his nose but no rales rhonchi or wheezes today

## 2022-09-23 ENCOUNTER — RX RENEWAL (OUTPATIENT)
Age: 87
End: 2022-09-23

## 2023-01-02 ENCOUNTER — RX RENEWAL (OUTPATIENT)
Age: 88
End: 2023-01-02

## 2023-01-03 ENCOUNTER — RX RENEWAL (OUTPATIENT)
Age: 88
End: 2023-01-03

## 2023-01-04 ENCOUNTER — APPOINTMENT (OUTPATIENT)
Dept: CARDIOLOGY | Facility: CLINIC | Age: 88
End: 2023-01-04
Payer: MEDICARE

## 2023-01-04 VITALS
WEIGHT: 161 LBS | BODY MASS INDEX: 24.48 KG/M2 | HEART RATE: 55 BPM | DIASTOLIC BLOOD PRESSURE: 60 MMHG | SYSTOLIC BLOOD PRESSURE: 138 MMHG | OXYGEN SATURATION: 100 %

## 2023-01-04 DIAGNOSIS — R09.81 NASAL CONGESTION: ICD-10-CM

## 2023-01-04 PROCEDURE — 99214 OFFICE O/P EST MOD 30 MIN: CPT

## 2023-01-04 NOTE — ASSESSMENT
[FreeTextEntry1] :  the patient's cardiac status is quite stable since last visit.  He has no angina or evidence of CHF.  His EKG is unremarkable except for first-degree AV block and sinus bradycardia.  His cardiac status remains quite stable since the last visit in September\par \par 1.  Chronic ischemic heart disease status post remote PTCI status post coronary bypass surgery no active angina\par \par 2.  Chronic shortness of breath  this seems to have improved since his latest ENT surgery and probably his shortness of breath is more related to his nasal polyps etc.\par \par 3.  Nasal polyps\par  4.  Carotid artery disease- being treated medically total occlusion followed by neurology\par \par 5.  Renal stone status post renal stent  and lithotripsy now stents removed\par \par  overall his cardiac status remains stable.  His main problem now is sciatica which she will seek orthopedic consultation\par \par \par \par

## 2023-01-04 NOTE — PHYSICAL EXAM
[Well Developed] : well developed [Well Nourished] : well nourished [No Acute Distress] : no acute distress [Normal Conjunctiva] : normal conjunctiva [No Carotid Bruit] : no carotid bruit [Normal S1, S2] : normal S1, S2 [No Murmur] : no murmur [Clear Lung Fields] : clear lung fields [No Respiratory Distress] : no respiratory distress  [No Edema] : no edema [de-identified] :  no rales rhonchi or

## 2023-01-04 NOTE — HISTORY OF PRESENT ILLNESS
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  Clinically he has been doing quite well without chest pain no shortness of breath.  On occasion he has some atypical chest pain.\par \par 2D echo March 2021 revealed preserved LV function and EKG normal sinus rhythm otherwise unremarkable.  There is no evidence of an arrhythmia\par \par He developed hematuria and the diagnosis of a renal stone and he was scheduled for lithotripsy\par \par However he related to his internist that he an episode of decreased vision in the left eye approximately 4 months ago.  He has had no significant recurrence of that but occasionally has some numbness in his right arm\par \par He does have a history of known carotid disease and is followed by Dr. Jones and was told in the past he may have about a 50 to 70% stenosis in his carotid.  He has not had a repeat neurologic evaluation with carotid Doppler for some time\par \par Subsequent evaluation apparently revealed a total occlusion of the carotid and medical therapy was recommended.  I do not have any hard copy of the results of that testing.  Apparently he is scheduled for other testing by the neurosurgeon.  He has no neurologic symptoms\par \par He recently was admitted with severe back pain from a renal stone and required cystoscopy and deployment of a stent.  He still has some back pain but it is improved\par \par The patient recently underwent lumbar trypsin and some of the stones were removed but there is still stones present.  He also has a stent\par \par From a cardiac point of view he has been stable with no chest pain chest pressure.  He does have chronic shortness of breath related to his nasal polyps.  He has had no neurologic symptoms\par \par EKG June 7, 2022 normal sinus rhythm Probable first-degree AV block  otherwisewithin normal limits\par \par  he has had several urologic procedures and eventually the stent was removed and he has good urine output and no urologic issues at the present time\par \par .  He denies chest pain or chest pressure does have shortness of breath but probably mostly related to his nasal polyps which will be treated with injections as of the most recent otolaryngology appointment\par \par  EKG dated September 7, 2022 normal sinus rhythm sinus bradycardia occasional APC first-degree AV block otherwise within normal limits\par \par  visit January 4, 2023: Patient recently had an ENT procedure to open up his airways from his nasal polyps which has improved his breathing.  He is very bothered by lower back pain probably has sciatica. over from a cardiac point of view he is quite stable without chest pain or chest pressure or shortness of breath.  His breathing has improved since his recent ENT procedure\par \par \par

## 2023-01-04 NOTE — DISCUSSION/SUMMARY
[FreeTextEntry1] :  patient is quite stable from a cardiac point of view and should continue his present regimen of medications.  No further cardiac work-up needed presently.  Routine follow-up again in 4 months

## 2023-05-05 ENCOUNTER — APPOINTMENT (OUTPATIENT)
Dept: CARDIOLOGY | Facility: CLINIC | Age: 88
End: 2023-05-05
Payer: MEDICARE

## 2023-05-05 ENCOUNTER — NON-APPOINTMENT (OUTPATIENT)
Age: 88
End: 2023-05-05

## 2023-05-05 VITALS
OXYGEN SATURATION: 100 % | DIASTOLIC BLOOD PRESSURE: 60 MMHG | BODY MASS INDEX: 26.67 KG/M2 | WEIGHT: 176 LBS | SYSTOLIC BLOOD PRESSURE: 128 MMHG | HEIGHT: 68 IN | HEART RATE: 47 BPM

## 2023-05-05 DIAGNOSIS — F41.9 ANXIETY DISORDER, UNSPECIFIED: ICD-10-CM

## 2023-05-05 DIAGNOSIS — I77.9 DISORDER OF ARTERIES AND ARTERIOLES, UNSPECIFIED: ICD-10-CM

## 2023-05-05 PROCEDURE — 99214 OFFICE O/P EST MOD 30 MIN: CPT

## 2023-05-05 PROCEDURE — 93000 ELECTROCARDIOGRAM COMPLETE: CPT

## 2023-05-05 NOTE — DISCUSSION/SUMMARY
[FreeTextEntry1] :  the patient continue on his present regimen of medication.  No further cardiac work-up is needed.  Routine follow-up again in 4 months [EKG obtained to assist in diagnosis and management of assessed problem(s)] : EKG obtained to assist in diagnosis and management of assessed problem(s)

## 2023-05-05 NOTE — PHYSICAL EXAM
[Well Developed] : well developed [Well Nourished] : well nourished [No Acute Distress] : no acute distress [Normal Conjunctiva] : normal conjunctiva [No Carotid Bruit] : no carotid bruit [Normal S1, S2] : normal S1, S2 [No Murmur] : no murmur [Clear Lung Fields] : clear lung fields [No Respiratory Distress] : no respiratory distress  [No Edema] : no edema [de-identified] :  no rales rhonchi or

## 2023-05-05 NOTE — HISTORY OF PRESENT ILLNESS
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  Clinically he has been doing quite well without chest pain no shortness of breath.  On occasion he has some atypical chest pain.\par \par 2D echo March 2021 revealed preserved LV function and EKG normal sinus rhythm otherwise unremarkable.  There is no evidence of an arrhythmia\par \par He developed hematuria and the diagnosis of a renal stone and he was scheduled for lithotripsy\par \par However he related to his internist that he an episode of decreased vision in the left eye approximately 4 months ago.  He has had no significant recurrence of that but occasionally has some numbness in his right arm\par \par He does have a history of known carotid disease and is followed by Dr. Jones and was told in the past he may have about a 50 to 70% stenosis in his carotid.  He has not had a repeat neurologic evaluation with carotid Doppler for some time\par \par Subsequent evaluation apparently revealed a total occlusion of the carotid and medical therapy was recommended.  I do not have any hard copy of the results of that testing.  Apparently he is scheduled for other testing by the neurosurgeon.  He has no neurologic symptoms\par \par He recently was admitted with severe back pain from a renal stone and required cystoscopy and deployment of a stent.  He still has some back pain but it is improved\par \par The patient recently underwent lumbar trypsin and some of the stones were removed but there is still stones present.  He also has a stent\par \par From a cardiac point of view he has been stable with no chest pain chest pressure.  He does have chronic shortness of breath related to his nasal polyps.  He has had no neurologic symptoms\par \par EKG June 7, 2022 normal sinus rhythm Probable first-degree AV block  otherwisewithin normal limits\par \par  he has had several urologic procedures and eventually the stent was removed and he has good urine output and no urologic issues at the present time\par \par .  He denies chest pain or chest pressure does have shortness of breath but probably mostly related to his nasal polyps which will be treated with injections as of the most recent otolaryngology appointment\par \par  EKG dated September 7, 2022 normal sinus rhythm sinus bradycardia occasional APC first-degree AV block otherwise within normal limits\par \par  visit January 4, 2023: Patient recently had an ENT procedure to open up his airways from his nasal polyps which has improved his breathing.  He is very bothered by lower back pain probably has sciatica. over from a cardiac point of view he is quite stable without chest pain or chest pressure or shortness of breath.  His breathing has improved since his recent ENT procedure\par \par  visit May 5, 2023: The patient breathing is somewhat better since his polyp surgery.  He however denies chest pain chest pressure shortness of breath.  The edema of his lower extremities has improved on Lasix.  There has been no significant change in his cardiac status\par \par  EKG dated May 5, 2023 normal sinus rhythm first-degree AV block minor nonspecific ST-T wave changes essentially unchanged\par \par \par

## 2023-05-05 NOTE — REASON FOR VISIT
[FreeTextEntry1] : Fred Correiaen 87 years old here for follow-up of his cardiac status.  He was last seen on January 4, 2023

## 2023-05-05 NOTE — ASSESSMENT
[FreeTextEntry1] :  the patient's cardiac status is quite stable since last visit.  He has no angina or evidence of CHF.  His EKG is unremarkable except for first-degree AV block and sinus bradycardia.  His cardiac status remains quite stable since the last  in January 2023.  He has no angina minimal edema of his lower extremities, his shortness of breath has improved since the polyp surgery\par \par 1.  Chronic ischemic heart disease status post remote PTCI status post coronary bypass surgery no active angina\par \par 2.  Chronic shortness of breath  this seems to have improved since his latest ENT surgery and probably his shortness of breath is more related to his nasal polyps etc.\par \par 3.  Nasal polyps\par  4.  Carotid artery disease- being treated medically total occlusion followed by neurology\par \par 5.  Renal stone status post renal stent  and lithotripsy now stents removed\par \par  overall his cardiac status remains stable. \par \par \par \par

## 2023-09-01 ENCOUNTER — APPOINTMENT (OUTPATIENT)
Dept: CARDIOLOGY | Facility: CLINIC | Age: 88
End: 2023-09-01
Payer: MEDICARE

## 2023-09-01 ENCOUNTER — NON-APPOINTMENT (OUTPATIENT)
Age: 88
End: 2023-09-01

## 2023-09-01 VITALS
HEART RATE: 55 BPM | OXYGEN SATURATION: 98 % | DIASTOLIC BLOOD PRESSURE: 86 MMHG | WEIGHT: 165 LBS | SYSTOLIC BLOOD PRESSURE: 138 MMHG | BODY MASS INDEX: 25.09 KG/M2

## 2023-09-01 DIAGNOSIS — Z95.1 PRESENCE OF AORTOCORONARY BYPASS GRAFT: ICD-10-CM

## 2023-09-01 DIAGNOSIS — J33.9 NASAL POLYP, UNSPECIFIED: ICD-10-CM

## 2023-09-01 DIAGNOSIS — R06.02 SHORTNESS OF BREATH: ICD-10-CM

## 2023-09-01 DIAGNOSIS — I25.10 ATHEROSCLEROTIC HEART DISEASE OF NATIVE CORONARY ARTERY W/OUT ANGINA PECTORIS: ICD-10-CM

## 2023-09-01 PROCEDURE — 99215 OFFICE O/P EST HI 40 MIN: CPT

## 2023-09-01 PROCEDURE — 93000 ELECTROCARDIOGRAM COMPLETE: CPT

## 2023-09-01 NOTE — PHYSICAL EXAM
[Well Developed] : well developed [Well Nourished] : well nourished [No Acute Distress] : no acute distress [Normal Conjunctiva] : normal conjunctiva [No Carotid Bruit] : no carotid bruit [Normal S1, S2] : normal S1, S2 [No Murmur] : no murmur [No Respiratory Distress] : no respiratory distress  [Clear Lung Fields] : clear lung fields [No Edema] : no edema [de-identified] : Anxious but no acute distress [de-identified] :  no rales rhonchi or

## 2023-09-01 NOTE — HISTORY OF PRESENT ILLNESS
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  Clinically he has been doing quite well without chest pain no shortness of breath.  On occasion he has some atypical chest pain.  2D echo March 2021 revealed preserved LV function and EKG normal sinus rhythm otherwise unremarkable.  There is no evidence of an arrhythmia  He developed hematuria and the diagnosis of a renal stone and he was scheduled for lithotripsy  However he related to his internist that he an episode of decreased vision in the left eye approximately 4 months ago.  He has had no significant recurrence of that but occasionally has some numbness in his right arm  He does have a history of known carotid disease and is followed by Dr. Jones and was told in the past he may have about a 50 to 70% stenosis in his carotid.  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  I do not have any hard copy of the results of that testing.  Apparently he is scheduled for other testing by the neurosurgeon.  He has no neurologic symptoms  He recently was admitted with severe back pain from a renal stone and required cystoscopy and deployment of a stent.  He still has some back pain but it is improved  The patient recently underwent lumbar trypsin and some of the stones were removed but there is still stones present.  He also has a stent  From a cardiac point of view he has been stable with no chest pain chest pressure.  He does have chronic shortness of breath related to his nasal polyps.  He has had no neurologic symptoms  EKG June 7, 2022 normal sinus rhythm Probable first-degree AV block  otherwisewithin normal limits   he has had several urologic procedures and eventually the stent was removed and he has good urine output and no urologic issues at the present time  .  He denies chest pain or chest pressure does have shortness of breath but probably mostly related to his nasal polyps which will be treated with injections as of the most recent otolaryngology appointment   EKG dated September 7, 2022 normal sinus rhythm sinus bradycardia occasional APC first-degree AV block otherwise within normal limits   visit January 4, 2023: Patient recently had an ENT procedure to open up his airways from his nasal polyps which has improved his breathing.  He is very bothered by lower back pain probably has sciatica. over from a cardiac point of view he is quite stable without chest pain or chest pressure or shortness of breath.  His breathing has improved since his recent ENT procedure   visit May 5, 2023: The patient breathing is somewhat better since his polyp surgery.  He however denies chest pain chest pressure shortness of breath.  The edema of his lower extremities has improved on Lasix.  There has been no significant change in his cardiac status   EKG dated May 5, 2023 normal sinus rhythm first-degree AV block minor nonspecific ST-T wave changes essentially unchanged  Visit September 1, 2023: The patient is moving.  The process of moving he has been experiencing several symptoms of loss of his energy.  He went to the beach and had difficulty moving and needed to be brought in a wheelchair.  He also complained of some lower epigastric discomfort.  His main symptom has been this lack of power which had occurred frequently.  He finally went to the emergency room at .  He had no evidence of a myocardial infarction.  His blood tests were unremarkable with a slightly elevated creatinine and a low platelet count of about 120,000.  His troponin and CPK were normal today on September 1 he is feeling well and back to his good energy level.  He was sent home on isosorbide mononitrate 30 mg in addition to his regular medications.   EKG September 1, 2023 normal sinus rhythm and within normal limits The patient is presently feeling back to baseline with good energy level and no recurrence of the symptoms that he had the prior week. I walked him up and down the corridor back-and-forth at a fairly rapid pace.  He had a good pace his oxygen saturation remained 98% and he was asymptomatic .

## 2023-09-01 NOTE — ASSESSMENT
[FreeTextEntry1] :  the patient's cardiac status is quite stable since last visit.  He has no angina or evidence of CHF.  His EKG is unremarkable except for first-degree AV block and sinus bradycardia.  His cardiac status remains quite stable since the last  in January 2023.  He has no angina minimal edema of his lower extremities, his shortness of breath has improved since the polyp surgery.  He recently was admitted to [facility] with nonspecific fatigue vague epigastric discomfort which was recurrent over several days during a period when he was under a great deal of stress moving.  Presently he is back to baseline and his work-up at [facility] was unremarkable with no evidence of myocardial infarction.  He presently has a normal EKG and normal physical exam and reasonable exercise tolerance  1.  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  Chronic shortness of breath  this seems to have improved since his latest ENT surgery and probably his shortness of breath is more related to his nasal polyps etc.  3.  Nasal polyps  4.  Carotid artery disease- being treated medically total occlusion followed by neurology  5.  Renal stone status post renal stent  and lithotripsy now stents removed  6.  Recent episode of exertional fatigue and epigastric discomfort now appears to be resolved.  No evidence of acute ischemia.  He was placed on isosorbide dinitrate for possible coronary spasm though there is no clear evidence for this.  Presently he is asymptomatic

## 2023-09-01 NOTE — DISCUSSION/SUMMARY
[FreeTextEntry1] : Patient continue his present regimen of medication Routine follow-up again in 2 months Follow-up with his internist If symptoms recur, then a further work-up would be in order but presently he seems to be back to baseline with a normal physical exam normal EKG and reasonable exercise tolerance [EKG obtained to assist in diagnosis and management of assessed problem(s)] : EKG obtained to assist in diagnosis and management of assessed problem(s)

## 2023-09-12 ENCOUNTER — APPOINTMENT (OUTPATIENT)
Dept: CARDIOLOGY | Facility: CLINIC | Age: 88
End: 2023-09-12

## 2023-11-14 ENCOUNTER — APPOINTMENT (OUTPATIENT)
Dept: CARDIOLOGY | Facility: CLINIC | Age: 88
End: 2023-11-14

## 2024-01-09 ENCOUNTER — RX RENEWAL (OUTPATIENT)
Age: 89
End: 2024-01-09

## 2024-01-17 ENCOUNTER — RX RENEWAL (OUTPATIENT)
Age: 89
End: 2024-01-17

## 2024-07-10 ENCOUNTER — APPOINTMENT (OUTPATIENT)
Dept: CARDIOLOGY | Facility: CLINIC | Age: 89
End: 2024-07-10
Payer: MEDICARE

## 2024-07-10 ENCOUNTER — NON-APPOINTMENT (OUTPATIENT)
Age: 89
End: 2024-07-10

## 2024-07-10 VITALS
OXYGEN SATURATION: 99 % | WEIGHT: 167 LBS | RESPIRATION RATE: 17 BRPM | HEIGHT: 68 IN | SYSTOLIC BLOOD PRESSURE: 138 MMHG | DIASTOLIC BLOOD PRESSURE: 82 MMHG | BODY MASS INDEX: 25.31 KG/M2 | HEART RATE: 56 BPM

## 2024-07-10 DIAGNOSIS — I77.9 DISORDER OF ARTERIES AND ARTERIOLES, UNSPECIFIED: ICD-10-CM

## 2024-07-10 DIAGNOSIS — J33.9 NASAL POLYP, UNSPECIFIED: ICD-10-CM

## 2024-07-10 DIAGNOSIS — R06.02 SHORTNESS OF BREATH: ICD-10-CM

## 2024-07-10 DIAGNOSIS — Z95.1 PRESENCE OF AORTOCORONARY BYPASS GRAFT: ICD-10-CM

## 2024-07-10 DIAGNOSIS — I20.9 ANGINA PECTORIS, UNSPECIFIED: ICD-10-CM

## 2024-07-10 DIAGNOSIS — R07.89 OTHER CHEST PAIN: ICD-10-CM

## 2024-07-10 PROCEDURE — G2211 COMPLEX E/M VISIT ADD ON: CPT

## 2024-07-10 PROCEDURE — 93000 ELECTROCARDIOGRAM COMPLETE: CPT

## 2024-07-10 PROCEDURE — 99214 OFFICE O/P EST MOD 30 MIN: CPT

## 2024-07-11 ENCOUNTER — RX RENEWAL (OUTPATIENT)
Age: 89
End: 2024-07-11

## 2024-10-09 ENCOUNTER — RX RENEWAL (OUTPATIENT)
Age: 89
End: 2024-10-09

## 2024-10-12 ENCOUNTER — EMERGENCY (EMERGENCY)
Facility: HOSPITAL | Age: 89
LOS: 1 days | Discharge: HOME | End: 2024-10-12
Attending: EMERGENCY MEDICINE | Admitting: EMERGENCY MEDICINE
Payer: MEDICARE

## 2024-10-12 VITALS
RESPIRATION RATE: 16 BRPM | TEMPERATURE: 98 F | DIASTOLIC BLOOD PRESSURE: 42 MMHG | HEIGHT: 66 IN | HEART RATE: 78 BPM | WEIGHT: 166.01 LBS | SYSTOLIC BLOOD PRESSURE: 119 MMHG | OXYGEN SATURATION: 98 %

## 2024-10-12 VITALS
RESPIRATION RATE: 16 BRPM | HEART RATE: 46 BPM | SYSTOLIC BLOOD PRESSURE: 137 MMHG | OXYGEN SATURATION: 99 % | DIASTOLIC BLOOD PRESSURE: 45 MMHG

## 2024-10-12 LAB
ALBUMIN SERPL ELPH-MCNC: 4.2 G/DL — SIGNIFICANT CHANGE UP (ref 3.3–5)
ALP SERPL-CCNC: 79 U/L — SIGNIFICANT CHANGE UP (ref 40–120)
ALT FLD-CCNC: 13 U/L — SIGNIFICANT CHANGE UP (ref 4–41)
ANION GAP SERPL CALC-SCNC: 11 MMOL/L — SIGNIFICANT CHANGE UP (ref 7–14)
AST SERPL-CCNC: 22 U/L — SIGNIFICANT CHANGE UP (ref 4–40)
BASOPHILS # BLD AUTO: 0.03 K/UL — SIGNIFICANT CHANGE UP (ref 0–0.2)
BASOPHILS NFR BLD AUTO: 0.5 % — SIGNIFICANT CHANGE UP (ref 0–2)
BILIRUB SERPL-MCNC: 0.6 MG/DL — SIGNIFICANT CHANGE UP (ref 0.2–1.2)
BUN SERPL-MCNC: 25 MG/DL — HIGH (ref 7–23)
CALCIUM SERPL-MCNC: 9.3 MG/DL — SIGNIFICANT CHANGE UP (ref 8.4–10.5)
CHLORIDE SERPL-SCNC: 106 MMOL/L — SIGNIFICANT CHANGE UP (ref 98–107)
CO2 SERPL-SCNC: 23 MMOL/L — SIGNIFICANT CHANGE UP (ref 22–31)
CREAT SERPL-MCNC: 1.39 MG/DL — HIGH (ref 0.5–1.3)
EGFR: 48 ML/MIN/1.73M2 — LOW
EOSINOPHIL # BLD AUTO: 0.44 K/UL — SIGNIFICANT CHANGE UP (ref 0–0.5)
EOSINOPHIL NFR BLD AUTO: 7 % — HIGH (ref 0–6)
FLUAV AG NPH QL: SIGNIFICANT CHANGE UP
FLUBV AG NPH QL: SIGNIFICANT CHANGE UP
GLUCOSE SERPL-MCNC: 103 MG/DL — HIGH (ref 70–99)
HCT VFR BLD CALC: 27 % — LOW (ref 39–50)
HGB BLD-MCNC: 9.5 G/DL — LOW (ref 13–17)
IANC: 4.23 K/UL — SIGNIFICANT CHANGE UP (ref 1.8–7.4)
IMM GRANULOCYTES NFR BLD AUTO: 0.6 % — SIGNIFICANT CHANGE UP (ref 0–0.9)
LYMPHOCYTES # BLD AUTO: 0.92 K/UL — LOW (ref 1–3.3)
LYMPHOCYTES # BLD AUTO: 14.6 % — SIGNIFICANT CHANGE UP (ref 13–44)
MAGNESIUM SERPL-MCNC: 1.9 MG/DL — SIGNIFICANT CHANGE UP (ref 1.6–2.6)
MCHC RBC-ENTMCNC: 33.8 PG — SIGNIFICANT CHANGE UP (ref 27–34)
MCHC RBC-ENTMCNC: 35.2 GM/DL — SIGNIFICANT CHANGE UP (ref 32–36)
MCV RBC AUTO: 96.1 FL — SIGNIFICANT CHANGE UP (ref 80–100)
MONOCYTES # BLD AUTO: 0.64 K/UL — SIGNIFICANT CHANGE UP (ref 0–0.9)
MONOCYTES NFR BLD AUTO: 10.2 % — SIGNIFICANT CHANGE UP (ref 2–14)
NEUTROPHILS # BLD AUTO: 4.23 K/UL — SIGNIFICANT CHANGE UP (ref 1.8–7.4)
NEUTROPHILS NFR BLD AUTO: 67.1 % — SIGNIFICANT CHANGE UP (ref 43–77)
NRBC # BLD: 0 /100 WBCS — SIGNIFICANT CHANGE UP (ref 0–0)
NRBC # FLD: 0 K/UL — SIGNIFICANT CHANGE UP (ref 0–0)
PHOSPHATE SERPL-MCNC: 2.8 MG/DL — SIGNIFICANT CHANGE UP (ref 2.5–4.5)
PLATELET # BLD AUTO: 163 K/UL — SIGNIFICANT CHANGE UP (ref 150–400)
POTASSIUM SERPL-MCNC: 4 MMOL/L — SIGNIFICANT CHANGE UP (ref 3.5–5.3)
POTASSIUM SERPL-SCNC: 4 MMOL/L — SIGNIFICANT CHANGE UP (ref 3.5–5.3)
PROT SERPL-MCNC: 6.6 G/DL — SIGNIFICANT CHANGE UP (ref 6–8.3)
RBC # BLD: 2.81 M/UL — LOW (ref 4.2–5.8)
RBC # FLD: 14.3 % — SIGNIFICANT CHANGE UP (ref 10.3–14.5)
RSV RNA NPH QL NAA+NON-PROBE: SIGNIFICANT CHANGE UP
SARS-COV-2 RNA SPEC QL NAA+PROBE: SIGNIFICANT CHANGE UP
SODIUM SERPL-SCNC: 140 MMOL/L — SIGNIFICANT CHANGE UP (ref 135–145)
WBC # BLD: 6.3 K/UL — SIGNIFICANT CHANGE UP (ref 3.8–10.5)
WBC # FLD AUTO: 6.3 K/UL — SIGNIFICANT CHANGE UP (ref 3.8–10.5)

## 2024-10-12 PROCEDURE — 99284 EMERGENCY DEPT VISIT MOD MDM: CPT | Mod: GC

## 2024-10-12 PROCEDURE — 93010 ELECTROCARDIOGRAM REPORT: CPT

## 2024-10-12 RX ORDER — SODIUM CHLORIDE 0.9 % (FLUSH) 0.9 %
1000 SYRINGE (ML) INJECTION ONCE
Refills: 0 | Status: COMPLETED | OUTPATIENT
Start: 2024-10-12 | End: 2024-10-12

## 2024-10-12 RX ADMIN — Medication 1000 MILLILITER(S): at 16:16

## 2024-10-12 NOTE — ED PROVIDER NOTE - PHYSICAL EXAMINATION
Attending/Henri: NAD; PERRL/EOMI, non-icterus, +dry mucosa; supple, no SCOTT, no JVD, RRR, CTAB; Abd-soft, NT/ND, no HSM; no LE edema, A&Ox3, nonfocal; Skin-warm/dry

## 2024-10-12 NOTE — ED PROVIDER NOTE - NSFOLLOWUPINSTRUCTIONS_ED_ALL_ED_FT
YOU WERE SEEN FOR abdominal cramping    YOU HAD blood work done.   You can take ibuprofen 400mg every 6 to 8 hours and Tylenol 1000mg every 6 to 8 hours as needed for pain.     FOLLOW UP WITH YOUR PRIMARY CARE PROVIDER    RETURN TO THE EMERGENCY DEPARTMENT FOR worsening pain, fever, vomiting, or any new/concerning symptoms.

## 2024-10-12 NOTE — ED ADULT NURSE REASSESSMENT NOTE - NS ED NURSE REASSESS COMMENT FT1
Resident aware of heart rate, pt in NAD, denies dizziness, chest pain, sob. Pt mentating well, will continue to monitor. No new orders.

## 2024-10-12 NOTE — ED ADULT NURSE REASSESSMENT NOTE - NS ED NURSE REASSESS COMMENT FT1
Pt bradycardic on CM, resident to be made aware, mentating well, family at bedside, will continue to monitor.

## 2024-10-12 NOTE — ED PROVIDER NOTE - PATIENT PORTAL LINK FT
You can access the FollowMyHealth Patient Portal offered by [facility] by registering at the following website: [email or web address]. By joining [facility]’s FollowMyHealth portal, you will also be able to view your health information using other applications (apps) compatible with our system.

## 2024-10-12 NOTE — ED PROVIDER NOTE - CLINICAL SUMMARY MEDICAL DECISION MAKING FREE TEXT BOX
A/P 89 yo M w/ mult problems as described above, p/w transient blurred vision, and abd cramps, nonbloody diarrhea and nausea now improving. exam noted for dry mucosa, and benign abd exam. Plan: Screening EKG, labs, IVF, reassess  Relevant EMR reviewed A/P 88 yo M w/ mult problems as described above, p/w transient blurred vision, and abd cramps, nonbloody diarrhea and nausea now improving. exam noted for dry mucosa, and benign abd exam. Plan: Screening EKG, labs, IVF, reassess  Relevant EMR reviewed    Avila Alfonso, PGY3 - This is a 89-year-old male with past medical history of basal cell carcinoma and CABG and GERD presenting today with increased bowel movements and nausea since last night with intermittent fever and chills.  Denies headache contrary to triage note.  Has intermittent vision loss has been transient and has been going on for many years and not having symptoms at this time.  No chest pain no shortness of breath.  No headache at this time.  No vision changes at this time.  No nausea or vomiting at this time.  No abdominal pain at this time.  Patient is feeling much better compared to yesterday.  Went to urgent care and was instructed to come to the ED.  Vital signs within normal limits except for sinus bradycardia to 40s.  Physical exam shows well-appearing male not in acute distress.  No crackles or wheezing no murmurs.  Abdomen soft and nontender.  No lower extremity swelling.  Cranial nerve exams are normal.  Notable no focal neurodeficits.  Most likely dehydration event from the increased bowel movements.  Will check labs and electrolytes.  Will give a bolus of fluids.  Most likely DC after symptomatic treatment.

## 2024-10-12 NOTE — ED PROVIDER NOTE - PROGRESS NOTE DETAILS
Avila Alfonso, PGY3 - patient reassessed. patient still feeling well. will dc w/ pcp f/u. Patient verbalized understanding and agrees with the plan.

## 2024-10-12 NOTE — ED ADULT TRIAGE NOTE - CHIEF COMPLAINT QUOTE
reports   frequent bms with nausea  since last pm with fever. headaches  x 2 days. . states has intermittent loss of vision  over years.

## 2024-10-12 NOTE — ED ADULT NURSE NOTE - OBJECTIVE STATEMENT
break cover RN> Pt reporting to the ED for abdominal pain, multiple episodes of diarrhea starting last night and subsiding this morning. Seen at urgent care and advised to come to ED. Pt report intermittent blurred vision X 1 year  experiencing episode last night and self resolved. Pt is AOX4. Pt denies chest pain or SOB. PT respirations even an unlabored. Pt appears to be comfortable, in NAD. Pt denies fever, chills, n/v/d. Pt denies abdominal pain, dysuria, hematuria. place don CM, sinus maricruz. awaiting further orders.

## 2024-10-12 NOTE — ED ADULT NURSE NOTE - NSFALLRISKINTERV_ED_ALL_ED

## 2024-10-12 NOTE — ED PROVIDER NOTE - NSICDXPASTSURGICALHX_GEN_ALL_CORE_FT
PAST SURGICAL HISTORY:  Basal cell carcinoma     H/O cataract extraction - bilateral     Hx of squamous cell carcinoma - MOHS surgery     Mohs defect of forehead

## 2024-10-12 NOTE — ED PROVIDER NOTE - NSICDXPASTMEDICALHX_GEN_ALL_CORE_FT
PAST MEDICAL HISTORY:  Anxiety     CA - skin cancer     Coronary artery disease     GERD (gastroesophageal reflux disease)     H/O MRSA  infection at MOHS site resolved     History of irritable bowel syndrome     Hyperlipemia     Hypertension     Stented coronary artery

## 2024-11-13 ENCOUNTER — APPOINTMENT (OUTPATIENT)
Dept: CARDIOLOGY | Facility: CLINIC | Age: 89
End: 2024-11-13
Payer: MEDICARE

## 2024-11-13 VITALS
HEART RATE: 42 BPM | OXYGEN SATURATION: 100 % | WEIGHT: 164 LBS | SYSTOLIC BLOOD PRESSURE: 144 MMHG | DIASTOLIC BLOOD PRESSURE: 51 MMHG | HEIGHT: 67 IN | BODY MASS INDEX: 25.74 KG/M2

## 2024-11-13 VITALS — DIASTOLIC BLOOD PRESSURE: 60 MMHG | SYSTOLIC BLOOD PRESSURE: 149 MMHG

## 2024-11-13 DIAGNOSIS — Z95.1 PRESENCE OF AORTOCORONARY BYPASS GRAFT: ICD-10-CM

## 2024-11-13 DIAGNOSIS — R06.02 SHORTNESS OF BREATH: ICD-10-CM

## 2024-11-13 DIAGNOSIS — I77.9 DISORDER OF ARTERIES AND ARTERIOLES, UNSPECIFIED: ICD-10-CM

## 2024-11-13 DIAGNOSIS — I25.10 ATHEROSCLEROTIC HEART DISEASE OF NATIVE CORONARY ARTERY W/OUT ANGINA PECTORIS: ICD-10-CM

## 2024-11-13 PROCEDURE — 99214 OFFICE O/P EST MOD 30 MIN: CPT

## 2024-11-13 PROCEDURE — G2211 COMPLEX E/M VISIT ADD ON: CPT

## 2024-11-13 RX ORDER — FLUTICASONE PROPIONATE 50 UG/1
50 POWDER, METERED RESPIRATORY (INHALATION) EVERY MORNING
Refills: 0 | Status: ACTIVE | COMMUNITY

## 2024-11-13 RX ORDER — CLOPIDOGREL BISULFATE 75 MG/1
75 TABLET, FILM COATED ORAL
Refills: 0 | Status: ACTIVE | COMMUNITY

## 2024-11-13 RX ORDER — ALBUTEROL 90 MCG
90 AEROSOL (GRAM) INHALATION
Refills: 0 | Status: ACTIVE | COMMUNITY

## 2024-11-13 RX ORDER — ALLOPURINOL 100 MG/1
100 TABLET ORAL EVERY MORNING
Refills: 0 | Status: ACTIVE | COMMUNITY

## 2025-03-11 ENCOUNTER — APPOINTMENT (OUTPATIENT)
Dept: CARDIOLOGY | Facility: CLINIC | Age: 89
End: 2025-03-11
Payer: MEDICARE

## 2025-03-11 ENCOUNTER — NON-APPOINTMENT (OUTPATIENT)
Age: 89
End: 2025-03-11

## 2025-03-11 VITALS
SYSTOLIC BLOOD PRESSURE: 128 MMHG | OXYGEN SATURATION: 98 % | BODY MASS INDEX: 26.31 KG/M2 | HEART RATE: 44 BPM | WEIGHT: 168 LBS | DIASTOLIC BLOOD PRESSURE: 60 MMHG

## 2025-03-11 DIAGNOSIS — I25.10 ATHEROSCLEROTIC HEART DISEASE OF NATIVE CORONARY ARTERY W/OUT ANGINA PECTORIS: ICD-10-CM

## 2025-03-11 DIAGNOSIS — Z95.1 PRESENCE OF AORTOCORONARY BYPASS GRAFT: ICD-10-CM

## 2025-03-11 DIAGNOSIS — F41.9 ANXIETY DISORDER, UNSPECIFIED: ICD-10-CM

## 2025-03-11 DIAGNOSIS — R07.89 OTHER CHEST PAIN: ICD-10-CM

## 2025-03-11 DIAGNOSIS — C61 MALIGNANT NEOPLASM OF PROSTATE: ICD-10-CM

## 2025-03-11 DIAGNOSIS — I77.9 DISORDER OF ARTERIES AND ARTERIOLES, UNSPECIFIED: ICD-10-CM

## 2025-03-11 DIAGNOSIS — R06.02 SHORTNESS OF BREATH: ICD-10-CM

## 2025-03-11 PROCEDURE — 93000 ELECTROCARDIOGRAM COMPLETE: CPT

## 2025-03-11 PROCEDURE — 99214 OFFICE O/P EST MOD 30 MIN: CPT

## 2025-03-11 PROCEDURE — G2211 COMPLEX E/M VISIT ADD ON: CPT

## 2025-04-01 ENCOUNTER — RX RENEWAL (OUTPATIENT)
Age: 89
End: 2025-04-01

## 2025-06-16 ENCOUNTER — APPOINTMENT (OUTPATIENT)
Dept: CARDIOLOGY | Facility: CLINIC | Age: 89
End: 2025-06-16
Payer: MEDICARE

## 2025-06-16 VITALS
SYSTOLIC BLOOD PRESSURE: 110 MMHG | WEIGHT: 167 LBS | OXYGEN SATURATION: 99 % | DIASTOLIC BLOOD PRESSURE: 70 MMHG | BODY MASS INDEX: 26.16 KG/M2 | HEART RATE: 40 BPM

## 2025-06-16 PROCEDURE — G2211 COMPLEX E/M VISIT ADD ON: CPT

## 2025-06-16 PROCEDURE — 99214 OFFICE O/P EST MOD 30 MIN: CPT

## 2025-07-02 ENCOUNTER — NON-APPOINTMENT (OUTPATIENT)
Age: 89
End: 2025-07-02

## 2025-07-02 ENCOUNTER — APPOINTMENT (OUTPATIENT)
Dept: CARDIOLOGY | Facility: CLINIC | Age: 89
End: 2025-07-02
Payer: MEDICARE

## 2025-07-02 ENCOUNTER — LABORATORY RESULT (OUTPATIENT)
Age: 89
End: 2025-07-02

## 2025-07-02 VITALS
OXYGEN SATURATION: 97 % | WEIGHT: 163 LBS | HEART RATE: 59 BPM | SYSTOLIC BLOOD PRESSURE: 140 MMHG | DIASTOLIC BLOOD PRESSURE: 70 MMHG | BODY MASS INDEX: 25.53 KG/M2

## 2025-07-02 PROBLEM — L03.115 CELLULITIS OF RIGHT LOWER EXTREMITY: Status: ACTIVE | Noted: 2025-07-02

## 2025-07-02 PROCEDURE — 99214 OFFICE O/P EST MOD 30 MIN: CPT

## 2025-07-02 PROCEDURE — G2211 COMPLEX E/M VISIT ADD ON: CPT

## 2025-07-02 PROCEDURE — 93000 ELECTROCARDIOGRAM COMPLETE: CPT

## 2025-07-03 LAB
ALBUMIN SERPL ELPH-MCNC: 4 G/DL
ALP BLD-CCNC: 86 U/L
ALT SERPL-CCNC: 17 U/L
ANION GAP SERPL CALC-SCNC: 15 MMOL/L
AST SERPL-CCNC: 21 U/L
BASOPHILS # BLD AUTO: 0.03 K/UL
BASOPHILS NFR BLD AUTO: 0.6 %
BILIRUB SERPL-MCNC: 0.7 MG/DL
BUN SERPL-MCNC: 20 MG/DL
CALCIUM SERPL-MCNC: 9.4 MG/DL
CHLORIDE SERPL-SCNC: 108 MMOL/L
CHOLEST SERPL-MCNC: 112 MG/DL
CO2 SERPL-SCNC: 18 MMOL/L
CREAT SERPL-MCNC: 1.24 MG/DL
EGFRCR SERPLBLD CKD-EPI 2021: 56 ML/MIN/1.73M2
EOSINOPHIL # BLD AUTO: 0.44 K/UL
EOSINOPHIL NFR BLD AUTO: 9.5 %
ESTIMATED AVERAGE GLUCOSE: 105 MG/DL
FOLATE SERPL-MCNC: >20 NG/ML
GLUCOSE SERPL-MCNC: 176 MG/DL
HBA1C MFR BLD HPLC: 5.3 %
HCT VFR BLD CALC: 26.8 %
HDLC SERPL-MCNC: 41 MG/DL
HGB BLD-MCNC: 9 G/DL
IMM GRANULOCYTES NFR BLD AUTO: 0.6 %
IRON SATN MFR SERPL: 25 %
IRON SERPL-MCNC: 67 UG/DL
LDLC SERPL-MCNC: 53 MG/DL
LYMPHOCYTES # BLD AUTO: 0.47 K/UL
LYMPHOCYTES NFR BLD AUTO: 10.2 %
MAN DIFF?: NORMAL
MCHC RBC-ENTMCNC: 33.6 G/DL
MCHC RBC-ENTMCNC: 33.6 PG
MCV RBC AUTO: 100 FL
MONOCYTES # BLD AUTO: 0.48 K/UL
MONOCYTES NFR BLD AUTO: 10.4 %
NEUTROPHILS # BLD AUTO: 3.17 K/UL
NEUTROPHILS NFR BLD AUTO: 68.7 %
NONHDLC SERPL-MCNC: 71 MG/DL
PLATELET # BLD AUTO: 146 K/UL
POTASSIUM SERPL-SCNC: 3.7 MMOL/L
PROT SERPL-MCNC: 5.8 G/DL
RBC # BLD: 2.68 M/UL
RBC # FLD: 15.9 %
SODIUM SERPL-SCNC: 140 MMOL/L
TIBC SERPL-MCNC: 270 UG/DL
TRIGL SERPL-MCNC: 94 MG/DL
UIBC SERPL-MCNC: 203 UG/DL
VIT B12 SERPL-MCNC: 322 PG/ML
WBC # FLD AUTO: 4.62 K/UL

## 2025-07-15 ENCOUNTER — APPOINTMENT (OUTPATIENT)
Dept: CARDIOLOGY | Facility: CLINIC | Age: 89
End: 2025-07-15
Payer: MEDICARE

## 2025-07-15 ENCOUNTER — NON-APPOINTMENT (OUTPATIENT)
Age: 89
End: 2025-07-15

## 2025-07-15 VITALS
BODY MASS INDEX: 25.61 KG/M2 | WEIGHT: 163.5 LBS | DIASTOLIC BLOOD PRESSURE: 60 MMHG | OXYGEN SATURATION: 97 % | SYSTOLIC BLOOD PRESSURE: 140 MMHG | HEART RATE: 75 BPM

## 2025-07-15 PROBLEM — D62 ANEMIA DUE TO ACUTE BLOOD LOSS: Status: ACTIVE | Noted: 2025-07-15

## 2025-07-15 PROCEDURE — G2211 COMPLEX E/M VISIT ADD ON: CPT

## 2025-07-15 PROCEDURE — 93000 ELECTROCARDIOGRAM COMPLETE: CPT

## 2025-07-15 PROCEDURE — 99214 OFFICE O/P EST MOD 30 MIN: CPT

## 2025-07-15 PROCEDURE — 93306 TTE W/DOPPLER COMPLETE: CPT

## 2025-07-15 RX ORDER — AMOXICILLIN AND CLAVULANATE POTASSIUM 875; 125 MG/1; MG/1
875-125 TABLET, COATED ORAL
Refills: 0 | Status: ACTIVE | COMMUNITY

## 2025-07-15 RX ORDER — APIXABAN 2.5 MG/1
2.5 TABLET, FILM COATED ORAL
Qty: 60 | Refills: 3 | Status: ACTIVE | COMMUNITY
Start: 2025-07-15 | End: 1900-01-01

## 2025-08-15 ENCOUNTER — APPOINTMENT (OUTPATIENT)
Dept: CARDIOLOGY | Facility: CLINIC | Age: 89
End: 2025-08-15
Payer: MEDICARE

## 2025-08-15 VITALS
WEIGHT: 165 LBS | SYSTOLIC BLOOD PRESSURE: 140 MMHG | BODY MASS INDEX: 25.9 KG/M2 | OXYGEN SATURATION: 95 % | HEIGHT: 67 IN | HEART RATE: 60 BPM | DIASTOLIC BLOOD PRESSURE: 58 MMHG

## 2025-08-15 DIAGNOSIS — F41.9 ANXIETY DISORDER, UNSPECIFIED: ICD-10-CM

## 2025-08-15 DIAGNOSIS — I48.0 PAROXYSMAL ATRIAL FIBRILLATION: ICD-10-CM

## 2025-08-15 DIAGNOSIS — Z95.1 PRESENCE OF AORTOCORONARY BYPASS GRAFT: ICD-10-CM

## 2025-08-15 DIAGNOSIS — I25.10 ATHEROSCLEROTIC HEART DISEASE OF NATIVE CORONARY ARTERY W/OUT ANGINA PECTORIS: ICD-10-CM

## 2025-08-15 DIAGNOSIS — C61 MALIGNANT NEOPLASM OF PROSTATE: ICD-10-CM

## 2025-08-15 DIAGNOSIS — J33.9 NASAL POLYP, UNSPECIFIED: ICD-10-CM

## 2025-08-15 DIAGNOSIS — D50.8 OTHER IRON DEFICIENCY ANEMIAS: ICD-10-CM

## 2025-08-15 PROCEDURE — 99203 OFFICE O/P NEW LOW 30 MIN: CPT

## 2025-08-15 PROCEDURE — G2211 COMPLEX E/M VISIT ADD ON: CPT
